# Patient Record
Sex: MALE | Race: ASIAN | NOT HISPANIC OR LATINO | URBAN - METROPOLITAN AREA
[De-identification: names, ages, dates, MRNs, and addresses within clinical notes are randomized per-mention and may not be internally consistent; named-entity substitution may affect disease eponyms.]

---

## 2021-08-11 ENCOUNTER — EMERGENCY (EMERGENCY)
Facility: HOSPITAL | Age: 26
LOS: 0 days | Discharge: HOME | End: 2021-08-11
Attending: EMERGENCY MEDICINE
Payer: COMMERCIAL

## 2021-08-11 VITALS
DIASTOLIC BLOOD PRESSURE: 78 MMHG | TEMPERATURE: 97 F | OXYGEN SATURATION: 100 % | HEART RATE: 68 BPM | SYSTOLIC BLOOD PRESSURE: 139 MMHG | RESPIRATION RATE: 18 BRPM

## 2021-08-11 VITALS — WEIGHT: 176.37 LBS | HEIGHT: 67 IN
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
## 2021-08-11 DIAGNOSIS — S61.312A LACERATION WITHOUT FOREIGN BODY OF RIGHT MIDDLE FINGER WITH DAMAGE TO NAIL, INITIAL ENCOUNTER: ICD-10-CM

## 2021-08-11 DIAGNOSIS — S61.211A LACERATION WITHOUT FOREIGN BODY OF LEFT INDEX FINGER WITHOUT DAMAGE TO NAIL, INITIAL ENCOUNTER: ICD-10-CM

## 2021-08-11 DIAGNOSIS — S61.317A LACERATION WITHOUT FOREIGN BODY OF LEFT LITTLE FINGER WITH DAMAGE TO NAIL, INITIAL ENCOUNTER: ICD-10-CM

## 2021-08-11 PROCEDURE — 12001 RPR S/N/AX/GEN/TRNK 2.5CM/<: CPT

## 2021-08-11 PROCEDURE — 99283 EMERGENCY DEPT VISIT LOW MDM: CPT | Mod: 25

## 2021-08-11 PROCEDURE — 99282 EMERGENCY DEPT VISIT SF MDM: CPT | Mod: 25

## 2021-08-11 NOTE — ED STATDOCS - NSFOLLOWUPINSTRUCTIONS_ED_ALL_ED_FT
8
please have the stitches removed in 7 days       Sutured Wound Care  ImageSutures are stitches that can be used to close wounds. Taking care of your wound properly can help prevent pain and infection. It can also help your wound to heal more quickly.    How is this treated?  Wound Care     Keep the wound clean and dry.  If you were given a bandage (dressing), change it at least one time per day or as told by your doctor. You should also change it if it gets wet or dirty.  Keep the wound completely dry for the first 24 hours or as told by your doctor. After that time, you may shower or bathe. However, make sure that the wound is not soaked in water until the sutures have been removed.  Clean the wound one time each day or as told by your doctor.    Wash the wound with soap and water.  Rinse the wound with water to remove all soap.  Pat the wound dry with a clean towel. Do not rub the wound.    After cleaning the wound, put a thin layer of antibiotic ointment on it as told your doctor. This ointment:    Helps to prevent infection.  Keeps the bandage from sticking to the wound.    Have the sutures removed as told by your doctor.  General Instructions     Take or apply medicines only as told by your doctor.  To help prevent scarring, make sure to cover your wound with sunscreen whenever you are outside after the sutures are removed and the wound is healed. Make sure to wear a sunscreen of at least 30 SPF.  If you were prescribed an antibiotic medicine or ointment, finish all of it even if you start to feel better.  Do not scratch or pick at the wound.  Keep all follow-up visits as told by your doctor. This is important.  Check your wound every day for signs of infection. Watch for:    Redness, swelling, or pain.  Fluid, blood, or pus.    Raise (elevate) the injured area above the level of your heart while you are sitting or lying down, if possible.  Avoid stretching your wound.  Drink enough fluids to keep your pee (urine) clear or pale yellow.  Contact a doctor if:  You were given a tetanus shot and you have any of these where the needle went in:    Swelling.  Very bad pain.  Redness.  Bleeding.    You have a fever.  A wound that was closed breaks open.  You notice a bad smell coming from the wound.  You notice something coming out of the wound, such as wood or glass.  Medicine does not help your pain.  You have any of these at the site of the wound.    More redness.  More swelling.  More pain.    You have any of these coming from the wound.    Fluid.  Blood.  Pus.    You notice a change in the color of your skin near the wound.  You need to change the bandage often due to fluid, blood, or pus coming from the wound.  You have a new rash.  You have numbness around the wound.  Get help right away if:  You have very bad swelling around the wound.  Your pain suddenly gets worse and is very bad.  You have painful lumps near the wound or on skin that is anywhere on your body.  You have a red streak going away from the wound.  The wound is on your hand or foot and you cannot move a finger or toe like normal.  The wound is on your hand or foot and you notice that your fingers or toes look pale or bluish.  This information is not intended to replace advice given to you by your health care provider. Make sure you discuss any questions you have with your health care provider.

## 2021-08-11 NOTE — ED ADULT NURSE NOTE - OBJECTIVE STATEMENT
patient presents with lacerations to left hand s/p being cut by . Pt states the  fell onto his left hand at 5PM, bleeding controlled with gauze, sustains laceration on 2nd, 3rd, 4th, and 5th digits. Tetanus shot UTD. No pain medications PTA.

## 2021-08-11 NOTE — ED STATDOCS - OBJECTIVE STATEMENT
26 y/o male presents with lacerations to left hand s/p being cut by . Pt states the  fell onto his left hand at 5PM, bleeding controlled with gauze, sustains laceration on 2nd, 3rd, 4th, and 5th digits. Tetanus shot UTD. No pain medications PTA. No daily medications. Right hand dominant.

## 2021-08-11 NOTE — ED STATDOCS - PROGRESS NOTE DETAILS
laceration repaired w/o complication, wound care and S&S of infection discussed, will d/c home with PMD f/u for wound check and suture removal, pt agreeable to d/c and plan of care  Pat Pacheco PA-C

## 2021-08-11 NOTE — ED STATDOCS - PATIENT PORTAL LINK FT
You can access the FollowMyHealth Patient Portal offered by [facility] by registering at the following website: [email or web address]. By joining [email or web address]’s FollowMyHealth portal, you will also be able to view your health information using other applications (apps) compatible with our system.

## 2021-08-18 ENCOUNTER — EMERGENCY (EMERGENCY)
Facility: HOSPITAL | Age: 26
LOS: 0 days | Discharge: HOME | End: 2021-08-18
Attending: EMERGENCY MEDICINE
Payer: COMMERCIAL

## 2021-08-18 VITALS
HEART RATE: 70 BPM | TEMPERATURE: 99 F | RESPIRATION RATE: 17 BRPM | OXYGEN SATURATION: 97 % | HEIGHT: 67 IN | DIASTOLIC BLOOD PRESSURE: 82 MMHG | SYSTOLIC BLOOD PRESSURE: 137 MMHG | WEIGHT: 176.37 LBS

## 2021-08-18 DIAGNOSIS — Z48.02 ENCOUNTER FOR REMOVAL OF SUTURES: ICD-10-CM

## 2021-08-18 PROCEDURE — L9995: CPT

## 2021-08-18 PROCEDURE — G0463: CPT

## 2021-08-18 NOTE — ED STATDOCS - OBJECTIVE STATEMENT
26 yo M presents to ED for suture removal, 5 sutures placed to L 2nd 3rd and 5th fingers on 08/11, no acute complaints at this time.

## 2021-08-18 NOTE — ED STATDOCS - PATIENT PORTAL LINK FT
You can access the FollowMyHealth Patient Portal offered by [facility] by registering at the following website: [email or web address]. By joining [employer]’s FollowMyHealth portal, you will also be able to view your health information using other applications (apps) compatible with our system.

## 2021-08-18 NOTE — ED STATDOCS - CARE PLAN
1 Principal Discharge DX:	Laceration of hand without complication, including fingers, subsequent encounter

## 2021-08-18 NOTE — ED STATDOCS - SKIN, MLM
5 sutures in placed to L 2nd 3rd and 5th fingers, CDI, no signs of dehiscence skin normal color for race, warm, dry and intact.

## 2021-08-19 PROBLEM — Z78.9 OTHER SPECIFIED HEALTH STATUS: Chronic | Status: ACTIVE | Noted: 2021-08-11

## 2024-11-18 NOTE — ED ADULT NURSE NOTE - ALCOHOL PRE SCREEN (AUDIT - C)
Patient would prefer communication through Phone Call(s) and Live Well Message.   on cell  and , it is OK to leave message.       Health Maintenance       COVID-19 Vaccine (1)  Never done    Shingles Vaccine (1 of 2)  Never done    Influenza Vaccine (1)  Overdue since 9/1/2024    Traditional Medicare- Medicare Wellness Visit (Yearly)  Due soon on 5/1/2025           Following review of the above:  Patient is not proceeding with: COVID-19, Influenza, and Shingles    Note: Refer to final orders and clinician documentation.             Statement Selected